# Patient Record
(demographics unavailable — no encounter records)

---

## 2025-04-07 NOTE — DISCUSSION/SUMMARY
[de-identified] :  -Tele visit  Reviewed all MRI images with patient, interpretation was provided. Physical therapy prescribed for strengthening and stretching. Rx Medrol dose luis Rx Naprosyn Rx Robaxin Patient will follow up in 6 weeks.      **Email provided. ** of O[facility] from [facility]  ----------------------------------------------- Home Exercise The patient is instructed on a home exercise program.  CULLEN GOLDSMITH Acting as a Scribe for Dr. Jose BEST, Cullen Goldsmith, attest that this documentation has been prepared under the direction and in the presence of Provider Anthony Garcia MD.  Activity Modification The patient was advised to modify their activities.  Dx / Natural History The patient was advised of the diagnosis.  The natural history of the pathology was explained in full to the patient in layman's terms.  Several different treatment options were discussed and explained in full to the patient including the risks and benefits of both surgical and non-surgical treatments.  All questions and concerns were answered.  Pain Guide Activities The patient was advised to let pain guide the gradual advancement of activities.  RICE I explained to the patient that rest, ice, compression, and elevation would benefit them.  They may return to activity after follow-up or when they no longer have any pain.  The patient's current medication management of their orthopedic diagnosis was reviewed today: (1) We discussed a comprehensive treatment plan that included possible pharmaceutical management involving the use of prescription strength medications including but not limited to options such as oral Naprosyn 500mg BID, once daily Meloxicam 15 mg, or 500-650 mg Tylenol versus over the counter oral medications and topical prescription NSAID Pennsaid vs over the counter Voltaren gel. (2) There is a moderate risk of morbidity with further treatment, especially from use of prescription strength medications and possible side effects of these medications which include upset stomach with oral medications, skin reactions to topical medications and cardiac/renal issues with long term use. (3) I recommended that the patient follow-up with their medical physician to discuss any significant specific potential issues with long term medication use such as interactions with current medications or with exacerbation of underlying medical comorbidities. (4) The benefits and risks associated with use of injectable, oral or topical, prescription and over the counter anti-inflammatory medications were discussed with the patient. The patient voiced understanding of the risks including but not limited to bleeding, stroke, kidney dysfunction, heart disease, and were referred to the black box warning label for further information.

## 2025-04-07 NOTE — PHYSICAL EXAM
[de-identified] : Neurologic: normal sensation, normal mood and affect, orientated and able to communicate   Constitutional: well developed and well nourished   Left Shoulder: left scapular winning anterior posterior thoracic tenderness anterior thoracic radicular pain

## 2025-04-07 NOTE — HISTORY OF PRESENT ILLNESS
[de-identified] : The patient is a 33 year old right hand dominant male who presents today complaining of left shoulder.   Date of Injury/Onset: ~ 1 week Pain:    At Rest: 1/10  With Activity:  4-7/10  Mechanism of injury: NKI, patient reports an occasional sharp pain in his left latissimus dorsi and left shoulder. Worse with sitting in certain positions. This is NOT a Work Related Injury being treated under Worker's Compensation. This is NOT an athletic injury occurring associated with an interscholastic or organized sports team. Quality of symptoms: sharp, radiated to left chest, left lower back and lats  Improves with: stretching, heat  Worse with: sitting in certain positions  Treatment/Imaging/Studies Since Last Visit: MRI 	Reports Available For Review Today: MRI @  4/4/25 Change since last visit:  Out of work/sport: no School/Sport/Position/Occupation:  Additional Information: None

## 2025-04-07 NOTE — PHYSICAL EXAM
[de-identified] : Neurologic: normal sensation, normal mood and affect, orientated and able to communicate   Constitutional: well developed and well nourished   Left Shoulder: left scapular winning anterior posterior thoracic tenderness anterior thoracic radicular pain

## 2025-04-07 NOTE — DATA REVIEWED
[FreeTextEntry1] : 04/04/25 [facility] MRI thoracic spine: 1. Convexity of the mid to lower thoracic spine towards the left and straightening of the cervical lordosis. 2. Multilevel degenerative disc disease with multilevel disc bulging bony ridging and small multiple protrusions without evidence of cord or exiting nerve impingement. 3. Small hemangioma in the posterior inferior aspect of the T7 vertebral body. 4. No acute thoracic vertebral body fracture or cord compression.     04/04/25 [facility] X-Ray Examination of the LEFT SHOULDER: 3+ views:  This scan was reviewed and interpreted by Dr. Garcia, and his findings are- A type 3 acromion, otherwise unremarkable   04/04/25 [facility] X-ray thoracic 2 view BT: ddd of upper thoracic spine noted on lateral view

## 2025-04-07 NOTE — DISCUSSION/SUMMARY
[de-identified] :  -Tele visit  Reviewed all MRI images with patient, interpretation was provided. Physical therapy prescribed for strengthening and stretching. Rx Medrol dose luis Rx Naprosyn Rx Robaxin Patient will follow up in 6 weeks.      **Email provided. ** of O[facility] from [facility]  ----------------------------------------------- Home Exercise The patient is instructed on a home exercise program.  CULLEN GOLDSMITH Acting as a Scribe for Dr. Jose BEST, Cullen Goldsmith, attest that this documentation has been prepared under the direction and in the presence of Provider Anthony Garcia MD.  Activity Modification The patient was advised to modify their activities.  Dx / Natural History The patient was advised of the diagnosis.  The natural history of the pathology was explained in full to the patient in layman's terms.  Several different treatment options were discussed and explained in full to the patient including the risks and benefits of both surgical and non-surgical treatments.  All questions and concerns were answered.  Pain Guide Activities The patient was advised to let pain guide the gradual advancement of activities.  RICE I explained to the patient that rest, ice, compression, and elevation would benefit them.  They may return to activity after follow-up or when they no longer have any pain.  The patient's current medication management of their orthopedic diagnosis was reviewed today: (1) We discussed a comprehensive treatment plan that included possible pharmaceutical management involving the use of prescription strength medications including but not limited to options such as oral Naprosyn 500mg BID, once daily Meloxicam 15 mg, or 500-650 mg Tylenol versus over the counter oral medications and topical prescription NSAID Pennsaid vs over the counter Voltaren gel. (2) There is a moderate risk of morbidity with further treatment, especially from use of prescription strength medications and possible side effects of these medications which include upset stomach with oral medications, skin reactions to topical medications and cardiac/renal issues with long term use. (3) I recommended that the patient follow-up with their medical physician to discuss any significant specific potential issues with long term medication use such as interactions with current medications or with exacerbation of underlying medical comorbidities. (4) The benefits and risks associated with use of injectable, oral or topical, prescription and over the counter anti-inflammatory medications were discussed with the patient. The patient voiced understanding of the risks including but not limited to bleeding, stroke, kidney dysfunction, heart disease, and were referred to the black box warning label for further information.

## 2025-04-07 NOTE — HISTORY OF PRESENT ILLNESS
[de-identified] : The patient is a 33 year old right hand dominant male who presents today complaining of left shoulder.   Date of Injury/Onset: ~ 1 week Pain:    At Rest: 1/10  With Activity:  4-7/10  Mechanism of injury: NKI, patient reports an occasional sharp pain in his left latissimus dorsi and left shoulder. Worse with sitting in certain positions. This is NOT a Work Related Injury being treated under Worker's Compensation. This is NOT an athletic injury occurring associated with an interscholastic or organized sports team. Quality of symptoms: sharp, radiated to left chest, left lower back and lats  Improves with: stretching, heat  Worse with: sitting in certain positions  Treatment/Imaging/Studies Since Last Visit: MRI 	Reports Available For Review Today: MRI @  4/4/25 Change since last visit:  Out of work/sport: no School/Sport/Position/Occupation:  Additional Information: None